# Patient Record
Sex: FEMALE | Race: WHITE | Employment: OTHER | URBAN - METROPOLITAN AREA
[De-identification: names, ages, dates, MRNs, and addresses within clinical notes are randomized per-mention and may not be internally consistent; named-entity substitution may affect disease eponyms.]

---

## 2020-02-07 ENCOUNTER — HOSPITAL ENCOUNTER (EMERGENCY)
Age: 60
Discharge: HOME | End: 2020-02-07
Attending: EMERGENCY MEDICINE

## 2020-02-07 ENCOUNTER — APPOINTMENT (OUTPATIENT)
Dept: CT IMAGING | Age: 60
End: 2020-02-07

## 2020-02-07 VITALS
HEART RATE: 66 BPM | WEIGHT: 140 LBS | SYSTOLIC BLOOD PRESSURE: 154 MMHG | TEMPERATURE: 98 F | BODY MASS INDEX: 21.22 KG/M2 | HEIGHT: 68 IN | OXYGEN SATURATION: 100 % | RESPIRATION RATE: 18 BRPM | DIASTOLIC BLOOD PRESSURE: 92 MMHG

## 2020-02-07 VITALS
RESPIRATION RATE: 18 BRPM | HEART RATE: 55 BPM | BODY MASS INDEX: 21.22 KG/M2 | DIASTOLIC BLOOD PRESSURE: 73 MMHG | HEIGHT: 68 IN | TEMPERATURE: 97.5 F | SYSTOLIC BLOOD PRESSURE: 131 MMHG | OXYGEN SATURATION: 100 % | WEIGHT: 140 LBS

## 2020-02-07 PROCEDURE — 99283 EMERGENCY DEPT VISIT LOW MDM: CPT

## 2020-02-07 PROCEDURE — 6370000000 HC RX 637 (ALT 250 FOR IP): Performed by: EMERGENCY MEDICINE

## 2020-02-07 PROCEDURE — 6360000002 HC RX W HCPCS: Performed by: EMERGENCY MEDICINE

## 2020-02-07 PROCEDURE — 96372 THER/PROPH/DIAG INJ SC/IM: CPT

## 2020-02-07 PROCEDURE — 72131 CT LUMBAR SPINE W/O DYE: CPT

## 2020-02-07 RX ORDER — DIAZEPAM 5 MG/1
5 TABLET ORAL ONCE
Status: COMPLETED | OUTPATIENT
Start: 2020-02-07 | End: 2020-02-07

## 2020-02-07 RX ORDER — KETOROLAC TROMETHAMINE 30 MG/ML
30 INJECTION, SOLUTION INTRAMUSCULAR; INTRAVENOUS ONCE
Status: COMPLETED | OUTPATIENT
Start: 2020-02-07 | End: 2020-02-07

## 2020-02-07 RX ORDER — LIDOCAINE 4 G/G
1 PATCH TOPICAL ONCE
Status: DISCONTINUED | OUTPATIENT
Start: 2020-02-07 | End: 2020-02-07 | Stop reason: HOSPADM

## 2020-02-07 RX ORDER — PREDNISONE 20 MG/1
60 TABLET ORAL ONCE
Status: COMPLETED | OUTPATIENT
Start: 2020-02-07 | End: 2020-02-07

## 2020-02-07 RX ORDER — DIAZEPAM 5 MG/1
5 TABLET ORAL EVERY 8 HOURS PRN
Qty: 5 TABLET | Refills: 0 | Status: SHIPPED | OUTPATIENT
Start: 2020-02-07 | End: 2020-02-09

## 2020-02-07 RX ORDER — NAPROXEN 250 MG/1
250 TABLET ORAL 2 TIMES DAILY WITH MEALS
Qty: 60 TABLET | Refills: 0 | Status: SHIPPED | OUTPATIENT
Start: 2020-02-07

## 2020-02-07 RX ORDER — ACETAMINOPHEN 325 MG/1
650 TABLET ORAL ONCE
Status: COMPLETED | OUTPATIENT
Start: 2020-02-07 | End: 2020-02-07

## 2020-02-07 RX ORDER — METHYLPREDNISOLONE 4 MG/1
TABLET ORAL
Qty: 1 KIT | Refills: 0 | Status: SHIPPED | OUTPATIENT
Start: 2020-02-07 | End: 2020-02-13

## 2020-02-07 RX ADMIN — PREDNISONE 60 MG: 20 TABLET ORAL at 08:25

## 2020-02-07 RX ADMIN — ACETAMINOPHEN 650 MG: 325 TABLET ORAL at 08:25

## 2020-02-07 RX ADMIN — DIAZEPAM 5 MG: 5 TABLET ORAL at 09:32

## 2020-02-07 RX ADMIN — KETOROLAC TROMETHAMINE 30 MG: 30 INJECTION, SOLUTION INTRAMUSCULAR at 09:46

## 2020-02-07 SDOH — HEALTH STABILITY: MENTAL HEALTH: HOW OFTEN DO YOU HAVE A DRINK CONTAINING ALCOHOL?: NEVER

## 2020-02-07 ASSESSMENT — PAIN SCALES - GENERAL
PAINLEVEL_OUTOF10: 7
PAINLEVEL_OUTOF10: 9
PAINLEVEL_OUTOF10: 9
PAINLEVEL_OUTOF10: 10
PAINLEVEL_OUTOF10: 3

## 2020-02-07 ASSESSMENT — PAIN DESCRIPTION - ORIENTATION
ORIENTATION: RIGHT
ORIENTATION: LOWER

## 2020-02-07 ASSESSMENT — PAIN DESCRIPTION - LOCATION
LOCATION: BACK
LOCATION: LEG

## 2020-02-07 ASSESSMENT — PAIN DESCRIPTION - DESCRIPTORS: DESCRIPTORS: RADIATING

## 2020-02-07 ASSESSMENT — ENCOUNTER SYMPTOMS
NAUSEA: 0
ABDOMINAL PAIN: 0
SHORTNESS OF BREATH: 0
COLOR CHANGE: 0
DIARRHEA: 0
VOMITING: 0
BACK PAIN: 1

## 2020-02-07 ASSESSMENT — PAIN DESCRIPTION - PAIN TYPE: TYPE: ACUTE PAIN

## 2020-02-07 NOTE — ED PROVIDER NOTES
[facility]  ED PROVIDER NOTE    Patient Identification  Pt Name: Felix Agudelo  MRN: [id number]  Bharath 1960  Date of evaluation: 2/7/2020  Provider: Gus Gonzales MD  PCP: No primary care provider on file. Chief Complaint  Leg Pain (pt was here earlier this morning for back pain, pt states the girl gave her a shot in the right hip and the pain is now in that hip all the way down to the calf. \"feels like something is in her calf\")      HPI  History provided by patient   This is a 64 y.o. female who presents to the ED for lower back pain. Sometimes radiates down right leg. Was seen in this emergency room earlier for this today. Pain has improved somewhat however it is now worse in the right calf. Patient is concerned about a blood clot. No numbness or tingling. Is ambulating well. Is worse when she lies flat. No fevers or chills. No urinary incontinence or stool incontinence. [email or web address] ROS  10 systems reviewed, pertinent positives/negatives per HPI otherwise noted to be negative. I have reviewed the following nursing documentation:  Allergies: Patient has no known allergies. Past medical history:   Past Medical History:   Diagnosis Date    Arthritis     Back pain      Past surgical history: History reviewed. No pertinent surgical history. Home medications:   Previous Medications    DIAZEPAM (VALIUM) 5 MG TABLET    Take 1 tablet by mouth every 8 hours as needed for Anxiety (muscle spasm) for up to 2 days. METHYLPREDNISOLONE (MEDROL, JOHN,) 4 MG TABLET    Take by mouth. Social history:  reports that she has never smoked. She has never used smokeless tobacco. She reports that she does not drink alcohol. Family history:  History reviewed. No pertinent family history.       Exam  ED Triage Vitals [02/07/20 1554]   BP Temp Temp Source Pulse Resp SpO2 Height Weight   -- 98 °F (36.7 °C) Oral 94 18 99 % 5' 8\" (1.727 m) 140 lb (63.5 kg)     Nursing note and vitals Patient verified last name and . Notified patient strep culture result. Patient states she is still coughing and have a sore throat. Patient requesting for abx to be sent to [facility] on [address]. Will notify provider.  Pt also aware to follow-up with PCP or return to urgent care if symptoms worsen or persists.   reviewed. Constitutional: In no acute distress  HENT:      Head: Normocephalic      Ears: External ears normal.      Nose: Nose normal.     Mouth: Membrane mucosa moist   Eyes: No discharge. Neck: Supple. Trachea midline. Cardiovascular: Regular rate. Warm extremities  Pulmonary/Chest: Effort normal. No respiratory distress. CTAB. Abdominal: Soft. No distension. Nontender  : Deferred  Rectal: Deferred   Musculoskeletal: Moves all extremities. No gross deformity. 2+ DP and PT pulses right lower extremity. No effusion of the knee. There is tenderness on palpation of the right calf and right thigh. No tenderness on palpation of the lower back  Neurological: Alert and oriented. Face symmetric. Speech is clear. Skin: Warm and dry. No rash. Psychiatric: Normal mood and affect. Behavior is normal.    Procedures          Radiology  CT LUMBAR SPINE WO CONTRAST    (Results Pending)       Labs  No results found for this visit on 02/07/20. Screenings        Point-of-care ultrasound performed right lower extremity DVT study  Indication right lower extremity pain  Linear probe used  Right femoral vein tract proximally to distally and was found to be fully compressible  Right popliteal vein fully compressible  Impression no DVT  Timeout was performed, no complications  MDM and ED Course  Patient is a 82-year-old woman who presents with lower back pain rating down the right lower extremity consistent with possible sciatica. Did not find any evidence of DVT. Will obtain CT L-spine to evaluate for spinal stenosis. No evidence of trauma or effusion. Already started on prednisone with a lidocaine patch and Valium with some improvement in pain. Will add naproxen to her list ([facility])    CT did show evidence of spinal stenosis. Patient is ambulatory and pain is currently under control. Has not attempted medical management as of yet. Patient did get referral to spinal surgeon and has an appointment next week. Believe that she is safe for discharge for evaluation until then. All questions answered and return precautions given. No signs of cauda equina    [unfilled]    Final Impression  1. Right leg pain    2. Low back pain, unspecified back pain laterality, unspecified chronicity, unspecified whether sciatica present        Pulse 94, temperature 98 °F (36.7 °C), temperature source Oral, resp. rate 18, height 5' 8\" (1.727 m), weight 140 lb (63.5 kg), SpO2 99 %. Disposition:  DISPOSITION        Patient Referrals:  No follow-up provider specified. Discharge Medications:  New Prescriptions    No medications on file       Discontinued Medications:  Discontinued Medications    No medications on file       This chart was generated using the [address] [place] [address] [address]  dictation system. I created this record but it may contain dictation errors given the limitations of this technology.         Anthony Mason MD  02/07/20 0063

## 2020-02-07 NOTE — ED NOTES
Findings and plan of care discussed at bedside by provider. Pt verbalized understanding of plan of care, pt discharged with all instructions reviewed and any prescriptions as applicable. All belongings in hand including discharge instructions, prescriptions x1, and personal belongings. Pt in no acute distress.      Marcela Cronin RN  02/07/20 1401

## 2020-02-07 NOTE — ED PROVIDER NOTES
walking with the right lower back pain. REVIEW OF SYSTEMS    (2-9 systems for level 4, 10 or more for level 5)     Review of Systems   Constitutional: Negative for chills, diaphoresis, fatigue and fever. HENT: Negative for congestion. Respiratory: Negative for shortness of breath. Cardiovascular: Negative for chest pain. Gastrointestinal: Negative for abdominal pain, diarrhea, nausea and vomiting. Genitourinary: Negative for decreased urine volume, difficulty urinating, dysuria, flank pain, hematuria and vaginal bleeding. Musculoskeletal: Positive for back pain and gait problem (due to low back pain, walked into ED today). Negative for arthralgias, joint swelling, neck pain and neck stiffness. Skin: Negative for color change. Neurological: Negative for syncope, weakness, light-headedness, numbness and headaches. Psychiatric/Behavioral: Negative for confusion. Positives and Pertinent negatives as per HPI. PASTMEDICAL HISTORY     Past Medical History:   Diagnosis Date    Arthritis     Back pain          SURGICAL HISTORY     History reviewed. No pertinent surgical history. CURRENT MEDICATIONS       Discharge Medication List as of 2/7/2020  9:34 AM          ALLERGIES     Patient has no known allergies. FAMILY HISTORY     History reviewed. No pertinent family history.        SOCIAL HISTORY       Social History     Socioeconomic History    Marital status:      Spouse name: None    Number of children: None    Years of education: None    Highest education level: None   Occupational History    None   Social Needs    Financial resource strain: None    Food insecurity:     Worry: None     Inability: None    Transportation needs:     Medical: None     Non-medical: None   Tobacco Use    Smoking status: Never Smoker    Smokeless tobacco: Never Used   Substance and Sexual Activity    Alcohol use: Never     Frequency: Never    Drug use: None    Sexual activity: no bony tenderness. Lumbar back: She exhibits tenderness (mild), bony tenderness (mild) and pain. She exhibits normal range of motion, no swelling, no edema, no deformity, no laceration and normal pulse. Right upper leg: She exhibits no tenderness, no bony tenderness, no swelling, no edema, no deformity and no laceration. Left upper leg: She exhibits no tenderness, no bony tenderness, no swelling, no edema, no deformity and no laceration. Right lower leg: No edema. Left lower leg: No edema. Skin:     General: Skin is warm and dry. Coloration: Skin is not jaundiced or pale. Findings: No abrasion, abscess, acne, bruising, burn, ecchymosis, erythema, signs of injury, laceration, lesion, petechiae, rash or wound. Neurological:      Mental Status: She is alert and oriented to person, place, and time. Mental status is at baseline. GCS: GCS eye subscore is 4. GCS verbal subscore is 5. GCS motor subscore is 6. Cranial Nerves: Cranial nerves are intact. No cranial nerve deficit, dysarthria or facial asymmetry. Sensory: Sensation is intact. No sensory deficit. Motor: Motor function is intact. No weakness, tremor, atrophy, abnormal muscle tone, seizure activity or pronator drift. Coordination: Coordination is intact. Gait: Gait abnormal (able to ambulate but right antalgic gait noted on exam). Deep Tendon Reflexes:      Reflex Scores:       Patellar reflexes are 2+ on the right side and 2+ on the left side.      Comments: Normal dorsiflexion and plantar flexion bilaterally, normal sensation to bilateral lower extremities, able to do hip flexion and knee extension and flexion on the right although slightly diminished compared to the left but patient states any movement of this leg causes significant right lower back pain but otherwise she denies actually feeling weak   Psychiatric:         Mood and Affect: Mood normal.         Behavior: Behavior normal. Behavior is cooperative. DIAGNOSTIC RESULTS   :    Labs Reviewed   URINALYSIS     I offered to do urinalysis but patient declined. All other labs were within normal range or not returned asof this dictation. EKG: All EKG's are interpreted by the Emergency Department Physician who either signs or Co-signs this chart in the absence of a cardiologist.        RADIOLOGY:   Non-plain film images such as CT, Ultrasound and MRI are read by the radiologist. Zenaida Moya images are visualized and preliminarily interpreted by the  ED Provider with the belowfindings:    I did offer to do additional imaging such as CT scan since she already had an x-ray and the pain was worsening. She has full mental capacity to make her own medical decisions understanding if it was any spinal cord pathology, could cause severe disability. At this time, she is declining and states she feels this is most likely related to musculoskeletal strain. Interpretation per the Radiologist below, if available at the time of this note:    VL Extremity Venous Right    (Results Pending)     Patient given an outpatient order for venous Doppler since the pain did occasionally radiate to her calf although I feel blood clot is unlikely and most likely related to sciatica.     PROCEDURES   Unless otherwise noted below, none     Procedures    CRITICAL CARE TIME   N/A    CONSULTS:  None    EMERGENCY DEPARTMENT COURSE and DIFFERENTIAL DIAGNOSIS/MDM:   Vitals:    Vitals:    02/07/20 0714 02/07/20 0846 02/07/20 0947   BP: 135/75 130/78 131/73   Pulse: 58 55 55   Resp: 16 16 18   Temp: 97.5 °F (36.4 °C)     TempSrc: Oral     SpO2: 98% 100% 100%   Weight: 140 lb (63.5 kg)     Height: 5' 8\" (1.727 m)         Patient was given the following medications:  Medications   acetaminophen (TYLENOL) tablet 650 mg (650 mg Oral Given 2/7/20 0825)   predniSONE (DELTASONE) tablet 60 mg (60 mg Oral Given 2/7/20 0825)   diazepam (VALIUM) tablet 5 mg (5 mg Oral Given 2/7/20 0932)   ketorolac (TORADOL) injection 30 mg (30 mg Intramuscular Given 2/7/20 0946)     Patient was evaluated due to concern for low back pain for the last 3 weeks with outpatient work-up already but worsening over the last 3 days that is now going down her right leg. Currently no radicular symptoms and straight leg test bilaterally was negative. She had minimal relief of symptoms in the emergency department but did not want any narcotics due to side effects of these in the past.  She was given the option of labs and imaging but declined at this time feeling like it is most likely related to spinal arthritis which I agree with this. She has full mental capacity to make her own medical decisions. She understands if she has any worsening pain associated with weakness in the legs, incontinence, fever, abdominal pain, then return to the emergency department for further evaluation, but otherwise follow-up with orthopedics or physical therapy over the next 3 to 5 days for any other concerns. I also offered to have physical therapy see her in the emergency department but she did not want to wait. She was well-appearing and in no acute distress at time of discharge and felt comfortable with this plan. She did briefly come back to the emergency department a few hours after I discharged her stating that she was still having pain near the site of injection of Toradol radiating down her leg. I reevaluated her briefly in the lobby and she was ambulating without difficulty and this is most likely related to sciatica. I recommended possibly getting an ultrasound to make sure no signs of blood clot although I feel this is unlikely, but otherwise at this time she is still safe for discharge.   She has no risk factors for spinal epidural hematoma or spinal epidural abscess and therefore no need for emergent MRI at this time, especially since no significant weakness on exam (very minimal on right) and just mainly complaining of low back pain. She ultimately did bounce back at the end of my shift on 2/7/20 but Dr. Mikhail Grady then saw her and did further work-up. Please see his note related to this. The patient tolerated their visit well. The patient and / or the family were informed of the results of any tests, a time was given to answer questions. FINAL IMPRESSION      1. Acute bilateral low back pain without sciatica    2. Right thigh pain          DISPOSITION/PLAN   DISPOSITION Decision To Discharge 02/07/2020 09:30:52 AM      PATIENT REFERRED TO:  [facility]  ED  [address]  Go to   If symptoms worsen    [address]  [phone]  Call in 1 day  As needed      DISCHARGEMEDICATIONS:  Discharge Medication List as of 2/7/2020  9:34 AM      START taking these medications    Details   methylPREDNISolone (MEDROL, JOHN,) 4 MG tablet Take by mouth., Disp-1 kit, R-0Print      diazepam (VALIUM) 5 MG tablet Take 1 tablet by mouth every 8 hours as needed for Anxiety (muscle spasm) for up to 2 days. , Disp-5 tablet, R-0Print             DISCONTINUED MEDICATIONS:  Discharge Medication List as of 2/7/2020  9:34 AM                 (Please note that portions of this note were completed with a voicerecognition program.  Efforts were made to edit the dictations but occasionally words are mis-transcribed.)    Michelle Kim MD (electronically signed)            Michelle Kim MD  02/07/20 [id number]

## 2020-02-07 NOTE — ED NOTES
Pt ambulatory to bathroom unassisted. Gait steady. Denies any further needs, call light within reach, will continue to monitor.         Tom Sibley RN  02/07/20 8855

## 2020-12-14 ENCOUNTER — NURSE ONLY (OUTPATIENT)
Dept: PRIMARY CARE CLINIC | Age: 60
End: 2020-12-14

## 2020-12-14 PROCEDURE — 99211 OFF/OP EST MAY X REQ PHY/QHP: CPT | Performed by: NURSE PRACTITIONER

## 2020-12-14 NOTE — PROGRESS NOTES
Laurel Jacobs received a viral test for COVID-19. They were educated on isolation and quarantine as appropriate. For any symptoms, they were directed to seek care from their PCP, given contact information to establish with a doctor, directed to an urgent care or the emergency room.

## 2020-12-15 LAB — SARS-COV-2, NAA: NOT DETECTED
